# Patient Record
Sex: FEMALE
[De-identification: names, ages, dates, MRNs, and addresses within clinical notes are randomized per-mention and may not be internally consistent; named-entity substitution may affect disease eponyms.]

---

## 2024-08-16 ENCOUNTER — APPOINTMENT (OUTPATIENT)
Dept: GASTROENTEROLOGY | Facility: CLINIC | Age: 39
End: 2024-08-16
Payer: COMMERCIAL

## 2024-08-16 VITALS — RESPIRATION RATE: 16 BRPM | HEIGHT: 61 IN | BODY MASS INDEX: 20.96 KG/M2 | TEMPERATURE: 98 F | WEIGHT: 111 LBS

## 2024-08-16 VITALS — DIASTOLIC BLOOD PRESSURE: 70 MMHG | OXYGEN SATURATION: 99 % | HEART RATE: 70 BPM | SYSTOLIC BLOOD PRESSURE: 118 MMHG

## 2024-08-16 DIAGNOSIS — E61.1 IRON DEFICIENCY: ICD-10-CM

## 2024-08-16 DIAGNOSIS — Z78.9 OTHER SPECIFIED HEALTH STATUS: ICD-10-CM

## 2024-08-16 DIAGNOSIS — R17 UNSPECIFIED JAUNDICE: ICD-10-CM

## 2024-08-16 PROBLEM — Z00.00 ENCOUNTER FOR PREVENTIVE HEALTH EXAMINATION: Status: ACTIVE | Noted: 2024-08-16

## 2024-08-16 PROCEDURE — 99203 OFFICE O/P NEW LOW 30 MIN: CPT

## 2024-08-16 RX ORDER — VITAMIN E ACETATE 670 MG
CAPSULE ORAL
Refills: 0 | Status: ACTIVE | COMMUNITY

## 2024-08-16 NOTE — PHYSICAL EXAM
[Alert] : alert [Normal Voice/Communication] : normal voice/communication [Healthy Appearing] : healthy appearing [No Acute Distress] : no acute distress [Sclera] : the sclera and conjunctiva were normal [Hearing Threshold Finger Rub Not [place]] : hearing was normal [Normal Lips/Gums] : the lips and gums were normal [Oropharynx] : the oropharynx was normal [Normal Appearance] : the appearance of the neck was normal [No Neck Mass] : no neck mass was observed [No Respiratory Distress] : no respiratory distress [No Acc Muscle Use] : no accessory muscle use [Respiration, Rhythm And Depth] : normal respiratory rhythm and effort [Auscultation Breath Sounds / Voice Sounds] : lungs were clear to auscultation bilaterally [Heart Rate And Rhythm] : heart rate was normal and rhythm regular [Normal S1, S2] : normal S1 and S2 [Murmurs] : no murmurs [Bowel Sounds] : normal bowel sounds [Abdomen Tenderness] : non-tender [No Masses] : no abdominal mass palpated [Abdomen Soft] : soft [] : no hepatosplenomegaly [Oriented To Time, Place, And Person] : oriented to person, place, and time

## 2024-08-17 LAB
BILIRUB DIRECT SERPL-MCNC: 0.2 MG/DL
BILIRUB INDIRECT SERPL-MCNC: 0.8 MG/DL
BILIRUB SERPL-MCNC: 1 MG/DL
GGT SERPL-CCNC: 12 U/L
HBV SURFACE AG SER QL: NONREACTIVE
HCV AB SER QL: NONREACTIVE
HCV S/CO RATIO: 0.32 S/CO

## 2024-08-17 NOTE — ASSESSMENT
[FreeTextEntry1] : 40 yo female, reportedly with low iron, seen by hematologist and had routine labs. Has followup with hematologist. Pt did not bring ANY labs or records for review. No familyhx of HBV or liver disease . [place] in [place]. Recent  labs with normal plts. Bili 1.3 and prior 1.4 . Normal alt, and ast.

## 2024-08-17 NOTE — HISTORY OF PRESENT ILLNESS
[FreeTextEntry1] : 40 yo female, reportedly with low iron, seen by hematologist and had routine labs. Has followup with hematologist. Pt did not bring ANY labs or records for review. No familyhx of HBV or liver disease . Born in [place]. Recent  labs with normal plts. Bili 1.3 and prior 1.4 . Normal alt, and ast.  IMP: essentially normal Lfts, with normal bilirubin on repeat  PLAN: no evidence of liver disease, no need for further evaluation Prn followup

## 2024-08-17 NOTE — ASSESSMENT
[FreeTextEntry1] : 40 yo female, reportedly with low iron, seen by hematologist and had routine labs. Has followup with hematologist. Pt did not bring ANY labs or records for review. No familyhx of HBV or liver disease . Born in [place]. Recent  labs with normal plts. Bili 1.3 and prior 1.4 . Normal alt, and ast. Albendazole Counseling:  I discussed with the patient the risks of albendazole including but not limited to cytopenia, kidney damage, nausea/vomiting and severe allergy.  The patient understands that this medication is being used in an off-label manner.

## 2024-08-17 NOTE — HISTORY OF PRESENT ILLNESS
[FreeTextEntry1] : 38 yo female, reportedly with low iron, seen by hematologist and had routine labs. Has followup with hematologist. Pt did not bring ANY labs or records for review. No familyhx of HBV or liver disease . Born in [place]. Recent  labs with normal plts. Bili 1.3 and prior 1.4 . Normal alt, and ast.  IMP: essentially normal Lfts, with normal bilirubin on repeat  PLAN: no evidence of liver disease, no need for further evaluation Prn followup